# Patient Record
Sex: MALE | Race: AMERICAN INDIAN OR ALASKA NATIVE
[De-identification: names, ages, dates, MRNs, and addresses within clinical notes are randomized per-mention and may not be internally consistent; named-entity substitution may affect disease eponyms.]

---

## 2020-02-22 ENCOUNTER — HOSPITAL ENCOUNTER (EMERGENCY)
Dept: HOSPITAL 5 - ED | Age: 75
LOS: 1 days | Discharge: HOME | End: 2020-02-23
Payer: MEDICARE

## 2020-02-22 DIAGNOSIS — F02.80: ICD-10-CM
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
## 2020-02-22 DIAGNOSIS — F20.9: ICD-10-CM
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
## 2020-02-22 DIAGNOSIS — I10: ICD-10-CM
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
## 2020-02-22 DIAGNOSIS — R25.2: Primary | ICD-10-CM

## 2020-02-22 DIAGNOSIS — E11.9: ICD-10-CM

## 2020-02-22 DIAGNOSIS — G31.83: ICD-10-CM

## 2020-02-22 LAB
BASOPHILS # (AUTO): 0.1 K/MM3 (ref 0–0.1)
BASOPHILS NFR BLD AUTO: 0.6 % (ref 0–1.8)
BUN SERPL-MCNC: 10 MG/DL (ref 9–20)
BUN/CREAT SERPL: 8 %
CALCIUM SERPL-MCNC: 8.8 MG/DL (ref 8.4–10.2)
EOSINOPHIL # BLD AUTO: 0.2 K/MM3 (ref 0–0.4)
EOSINOPHIL NFR BLD AUTO: 2.2 % (ref 0–4.3)
HCT VFR BLD CALC: 42.4 % (ref 35.5–45.6)
HEMOLYSIS INDEX: 28
HGB BLD-MCNC: 14.9 GM/DL (ref 11.8–15.2)
LYMPHOCYTES # BLD AUTO: 3.4 K/MM3 (ref 1.2–5.4)
LYMPHOCYTES NFR BLD AUTO: 35.7 % (ref 13.4–35)
MCHC RBC AUTO-ENTMCNC: 35 % (ref 32–34)
MCV RBC AUTO: 95 FL (ref 84–94)
MONOCYTES # (AUTO): 1.3 K/MM3 (ref 0–0.8)
MONOCYTES % (AUTO): 13.8 % (ref 0–7.3)
PLATELET # BLD: 84 K/MM3 (ref 140–440)
RBC # BLD AUTO: 4.47 M/MM3 (ref 3.65–5.03)

## 2020-02-22 PROCEDURE — 72125 CT NECK SPINE W/O DYE: CPT

## 2020-02-22 PROCEDURE — 36415 COLL VENOUS BLD VENIPUNCTURE: CPT

## 2020-02-22 PROCEDURE — 85025 COMPLETE CBC W/AUTO DIFF WBC: CPT

## 2020-02-22 PROCEDURE — 70450 CT HEAD/BRAIN W/O DYE: CPT

## 2020-02-22 PROCEDURE — 80048 BASIC METABOLIC PNL TOTAL CA: CPT

## 2020-02-22 NOTE — CAT SCAN REPORT
CT cervical spine wo con



INDICATION:

fall neck pain.



TECHNIQUE:

All CT scans at this location are performed using the following dose modulation technique: Automated 
exposure control. 



COMPARISON:

None available.



FINDINGS:

No acute fracture is seen in the cervical spine. There is diffuse cervical spondylosis. No prevertebr
al soft tissue swelling is seen. No spinal canal stenosis is seen. Emphysematous changes are noted wi
thin the lung apices. Paraspinous musculature is unremarkable.



IMPRESSION:

1. No acute fracture is seen in the cervical spine. 



Signer Name: Bharat Weber MD 

Signed: 2/22/2020 11:53 PM

 Workstation Name: [id number]

## 2020-02-22 NOTE — EMERGENCY DEPARTMENT REPORT
ED Fall HPI





- General


Chief Complaint: Fall


Stated Complaint: GENERAL WEAKNESS


Time Seen by Provider: 02/22/20 22:49


Source: patient, EMS


Mode of arrival: Stretcher


Limitations: No Limitations





- History of Present Illness


Initial Comments: 





Mr. Yeung is a 75-year-old male with history of schizophrenia hypertension and

Parkinson's disease and diabetes mellitus who presents from [facility] after ground-level fall.  He walked fell while 

attempting to go to the bathroom.  Since that time he appeared more sluggish 

than normal. Daughter at bedside states that he is at his baseline.  Mr. Yeung

states he has a mild cramp in his neck.  Otherwise he has been in normal state 

of health.  Denies any pain after the fall


MD Complaint: fall


-: This evening


Fall From: standing


When Fall Occurred: 1-3 hours PTA


Place Fall Occurred: home


Loss of Consciousness: none


Prolonged Down Time?: no


Symptoms Prior to Fall: none


Severity: mild


Context: tripped/slipped


Associated Symptoms: other ("Sluggishness")





- Related Data


                                    Allergies











Allergy/AdvReac Type Severity Reaction Status Date / Time


 


No Known Allergies Allergy   Unverified 11/16/14 14:31














ED Review of Systems


ROS: 


Stated complaint: GENERAL WEAKNESS


Other details as noted in HPI





Comment: All other systems reviewed and negative


Constitutional: denies: fever, malaise


Respiratory: denies: cough


Cardiovascular: denies: chest pain


Gastrointestinal: denies: abdominal pain


Neurological: denies: headache, numbness, paresthesias





ED Past Medical Hx





- Past Medical History


Previous Medical History?: Yes


Hx Hypertension: Yes


Hx Diabetes: Yes


Hx Psychiatric Treatment: Yes (schizophrenia paranoid)


Hx Dementia: Yes


Additional medical history: parkinsons





- Social History


Smoking Status: Current Every Day Smoker





ED Physical Exam





- General


Limitations: No Limitations


General appearance: alert, in no apparent distress, other (GCS 15)





- Head


Head exam: Present: atraumatic, normocephalic





- Eye


Eye exam: Present: normal appearance, PERRL.  Absent: scleral icterus, conjun

ctival injection





- ENT


ENT exam: Present: mucous membranes moist





- Neck


Neck exam: Present: normal inspection, full ROM.  Absent: tenderness, 

meningismus





- Respiratory


Respiratory exam: Present: normal lung sounds bilaterally.  Absent: respiratory 

distress, wheezes, rales, rhonchi





- Cardiovascular


Cardiovascular Exam: Present: regular rate, normal rhythm, normal heart sounds. 

Absent: rubs, gallop





- GI/Abdominal


GI/Abdominal exam: Present: soft.  Absent: distended, tenderness, guarding, 

rebound





- Extremities Exam


Extremities exam: Present: normal inspection





- Back Exam


Back exam: Present: normal inspection





- Neurological Exam


Neurological exam: Present: alert, oriented X3





- Psychiatric


Psychiatric exam: Present: normal affect, normal mood





- Skin


Skin exam: Present: warm, dry, intact, normal color.  Absent: rash





ED Course


                                   Vital Signs











  02/22/20





  22:20


 


Temperature 98.3 F


 


Pulse Rate 52 L


 


Respiratory 18





Rate 


 


Blood Pressure 155/84





[Left] 


 


O2 Sat by Pulse 97





Oximetry 














ED Medical Decision Making





- Lab Data


Result diagrams: 


                                 02/22/20 23:00





                                 02/22/20 23:00





- Radiology Data


Radiology results: report reviewed





CT head, CT cervical spine: No acute findings according to radiology





Radiologist impression of CT head and cervical spine 





CT cervical spine no acute fracture seen in the cervical spine, diffuse cervical

 spondylosis no soft tissue swelling no spinal canal stenosis, positive 

emphysematous changes noted in the lung apices





CT head: No acute intracranial abnormality





- Medical Decision Making





Mr. Yeung presents after a ground-level fall.  CT head and C-spine CBC 

chemistry all within acceptable limits.  No evidence of acute traumatic injury 

after evaluation performed here in emergency department.  No evidence of acute 

medical condition.  Discharged home in stable condition.  





CBC chemistry unremarkable with exception of mild thrombocytopenia.


Critical care attestation.: 


If time is entered above; I have spent that time in minutes in the direct care 

of this critically ill patient, excluding procedure time.








ED Disposition


Clinical Impression: 


 Fall





Disposition: DC-01 TO HOME OR SELFCARE


Is pt being admited?: No


Does the pt Need Aspirin: No


Condition: Stable


Instructions:  Fall Prevention for Older Adults (ED)


Referrals: 


PRIMARY CARE,MD [Referring] - 3-5 Days

## 2020-02-23 VITALS — DIASTOLIC BLOOD PRESSURE: 86 MMHG | SYSTOLIC BLOOD PRESSURE: 147 MMHG

## 2020-02-23 NOTE — CAT SCAN REPORT
CT HEAD WITHOUT CONTRAST



INDICATION:

fall sluggish elderly.



TECHNIQUE:

All CT scans at this location are performed using CT dose reduction for ALARA by means of automated e
xposure control. 



COMPARISON:

None available.



FINDINGS:

HEMORRHAGE: None.

EXTRA-AXIAL SPACES: Normal in size and morphology for the patient's age.

VENTRICULAR SYSTEM: Normal in size and morphology for the patient's age.

BRAIN PARENCHYMA: No acute findings. 

MIDLINE SHIFT OR HERNIATION: None.





ORBITS: Normal as visualized.

SOFT TISSUES OF HEAD: Normal.

CALVARIUM: Normal.

VISUALIZED PARANASAL SINUSES AND MASTOID AIR CELLS: Clear.



ADDITIONAL FINDINGS: None.



IMPRESSION:

1. No acute intracranial abnormality.



Signer Name: Bharat Weber MD 

Signed: 2/22/2020 11:56 PM

 Workstation Name: [id number]

## 2020-11-11 ENCOUNTER — HOSPITAL ENCOUNTER (EMERGENCY)
Dept: HOSPITAL 5 - ED | Age: 75
LOS: 1 days | Discharge: HOME | End: 2020-11-12
Payer: MEDICARE

## 2020-11-11 VITALS — DIASTOLIC BLOOD PRESSURE: 75 MMHG | SYSTOLIC BLOOD PRESSURE: 105 MMHG
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
## 2020-11-11 DIAGNOSIS — S13.9XXA: Primary | ICD-10-CM

## 2020-11-11 DIAGNOSIS — E11.9: ICD-10-CM
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
## 2020-11-11 DIAGNOSIS — M54.2: ICD-10-CM

## 2020-11-11 DIAGNOSIS — F03.90: ICD-10-CM

## 2020-11-11 DIAGNOSIS — I10: ICD-10-CM

## 2020-11-11 DIAGNOSIS — F20.0: ICD-10-CM

## 2020-11-11 DIAGNOSIS — Z79.899: ICD-10-CM
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
## 2020-11-11 PROCEDURE — 72040 X-RAY EXAM NECK SPINE 2-3 VW: CPT

## 2020-11-11 PROCEDURE — 72125 CT NECK SPINE W/O DYE: CPT

## 2020-11-11 NOTE — XRAY REPORT
Cervical spine 3 views



INDICATION: Neck pain following injury



IMPRESSION: Multilevel discogenic and uncovertebral arthropathy with disc osteophyte complex at C6-C7
 which may cause slight spinal canal narrowing. The open mouth odontoid view is suboptimal and pathol
ogy cannot be excluded in this region.



Signer Name: Law Thornton MD 

Signed: 11/11/2020 9:00 PM

Workstation Name: [id number]

## 2020-11-11 NOTE — CAT SCAN REPORT
CT cervical spine wo con 



INDICATION / CLINICAL INFORMATION:

neck pain. fall.



TECHNIQUE:

Axial CT imaging of the cervical spine was obtained without contrast. Coronal and sagittal reformatte
d imaging obtained and reviewed.  All CT scans at this location are performed using CT dose reduction
 for ALARA by means of automated exposure control. 



COMPARISON:

Cervical spine radiographs obtained earlier today. Priors CT cervical spine 2/22/2020.



FINDINGS:

No cervical spine fracture or traumatic malalignment. There is diffuse spondylytic change with promin
ent facet degenerative change throughout the spine. Severe degenerative changes also present in the C
1-C2 joint.



Paravertebral soft tissues are unremarkable.



Visualized lung apices demonstrate emphysematous change.



IMPRESSION:

1. No visible fracture or suggestion of traumatic malalignment.

2. Prominent diffuse spondylytic change throughout the spine. 



Signer Name: Dulce Hdez MD 

Signed: 11/11/2020 11:13 PM

Workstation Name: [id number]

## 2020-11-11 NOTE — EMERGENCY DEPARTMENT REPORT
ED Neck Pain/Injury HPI





- General


Chief Complaint: Neck Pain/Injury


Stated Complaint: NECK PAIN


Time Seen by Provider: 11/11/20 20:03


Source: patient


Mode of arrival: Stretcher


Limitations: Altered Mental Status





- History of Present Illness


Initial Comments: 





Patient is a 75-year-old male that presents emergency room for a fall and neck 

pain.  Patient states he fell at 5 PM today.  Patient states he is having some 

stiffness in his neck.  Patient denies loss of consciousness..  Patient states 

his pain is mild to moderate.  Patient states is an aching pain.  Patient states

he just feels stiff.  Patient denies fever and chills.  Patient denies loss of 

consciousness.  Patient denies head injury.  Patient denies headache.  Patient 

denies difficulties moving.





Patient has history of dementia, schizophrenia, hypertension, hyperlipidemia, 

diabetes,.  Patient presents ER with his records from his nursing home.  Patient

is coming from [facility].  Patient brought 

in by EMS.  








Patient denies recent travel.  Patient denies recent international travel.  

Patient denies exposure to the novel coronavirus.  Patient denies sick contacts.

 Patient denies fever and chills.  Patient denies cough.  Patient denies 

diarrhea.  Patient denies coming in contact with anybody with symptoms of the 

novel coronavirus.











MD Complaint: neck pain, neck injury


-: Sudden


Place: home


Severity: mild, moderate, constant


Quality: aching


Consistency: constant


Improves With: rest supine, remaining still


Worsens With: movement of neck


Context: fall


Associated Symptoms: denies: headache, fever, numbness, tingling, weakness, 

vertigo, difficulty walking, swollen glands, difficulty swallowing, nausea, 

vomiting


Treatments Prior to Arrival: none





- Related Data


                                  Previous Rx's











 Medication  Instructions  Recorded  Last Taken  Type


 


Cyclobenzaprine HCl [Flexeril 5 MG 5 mg PO TID PRN #20 tab 11/11/20 Unknown Rx





TAB]    











                                    Allergies











Allergy/AdvReac Type Severity Reaction Status Date / Time


 


No Known Allergies Allergy   Unverified 11/16/14 14:31














ED Review of Systems


ROS: 


Stated complaint: NECK PAIN


Other details as noted in HPI





Constitutional: denies: chills, fever


Eyes: denies: eye pain, eye discharge, vision change


ENT: denies: ear pain, throat pain


Respiratory: denies: cough, shortness of breath, wheezing


Cardiovascular: denies: chest pain, palpitations


Endocrine: no symptoms reported


Gastrointestinal: denies: abdominal pain, nausea, diarrhea


Genitourinary: denies: urgency, dysuria


Musculoskeletal: denies: back pain, joint swelling, arthralgia


Skin: denies: rash, lesions


Neurological: denies: headache, weakness, paresthesias


Psychiatric: denies: anxiety, depression


Hematological/Lymphatic: denies: easy bleeding, easy bruising





ED Past Medical Hx





- Past Medical History


Previous Medical History?: Yes


Hx Hypertension: Yes


Hx Diabetes: Yes (neuropathy)


Hx Psychiatric Treatment: Yes (schizophrenia paranoid)


Hx Dementia: Yes


Additional medical history: parkinsons





- Surgical History


Past Surgical History?: No





- Family History


Family history: no significant





- Social History


Smoking Status: Unknown if ever smoked


Substance Use Type: None





- Medications


Home Medications: 


                                Home Medications











 Medication  Instructions  Recorded  Confirmed  Last Taken  Type


 


Cyclobenzaprine HCl [Flexeril 5 MG 5 mg PO TID PRN #20 tab 11/11/20  Unknown Rx





TAB]     














ED Physical Exam





- General


Limitations: No Limitations


General appearance: alert, in no apparent distress





- Head


Head exam: Present: atraumatic, normocephalic





- Eye


Eye exam: Present: normal appearance, PERRL


Pupils: Present: normal accommodation





- ENT


ENT exam: Present: normal exam, mucous membranes moist, TM's normal bilaterally,

normal external ear exam





- Neck


Neck exam: Present: normal inspection, full ROM.  Absent: tenderness, 

meningismus, lymphadenopathy, thyromegaly





- Respiratory


Respiratory exam: Present: normal lung sounds bilaterally.  Absent: respiratory 

distress, wheezes, rales





- Cardiovascular


Cardiovascular Exam: Present: regular rate, normal rhythm.  Absent: systolic 

murmur, diastolic murmur, rubs, gallop





- GI/Abdominal


GI/Abdominal exam: Present: soft, normal bowel sounds.  Absent: distended, 

tenderness, guarding, rebound





- Rectal


Rectal exam: Present: deferred





- Extremities Exam


Extremities exam: Present: normal inspection





- Back Exam


Back exam: Present: normal inspection, full ROM.  Absent: tenderness, CVA 

tenderness (R), CVA tenderness (L), muscle spasm, paraspinal tenderness, 

vertebral tenderness





- Neurological Exam


Neurological exam: Present: alert, oriented X3, CN II-XII intact, reflexes 

normal.  Absent: motor sensory deficit





- Psychiatric


Psychiatric exam: Present: normal affect, normal mood





- Skin


Skin exam: Present: warm, dry, intact, normal color.  Absent: rash





ED Course


                                   Vital Signs











  11/11/20 11/11/20 11/11/20





  18:34 18:38 18:42


 


Temperature   99.4 F


 


Pulse Rate  63 


 


Respiratory 15 16 





Rate   


 


Blood Pressure   


 


Blood Pressure  147/87 





[Left]   


 


O2 Sat by Pulse  98 





Oximetry   














  11/11/20 11/11/20 11/11/20





  18:45 19:00 19:30


 


Temperature   


 


Pulse Rate 78  


 


Respiratory 12 15 





Rate   


 


Blood Pressure 147/87 137/91 136/84


 


Blood Pressure   





[Left]   


 


O2 Sat by Pulse 98 96 96





Oximetry   














  11/11/20 11/11/20 11/11/20





  20:01 20:30 21:23


 


Temperature   


 


Pulse Rate   56 L


 


Respiratory   16





Rate   


 


Blood Pressure 135/88 134/83 


 


Blood Pressure   105/75





[Left]   


 


O2 Sat by Pulse 96 94 98





Oximetry   














- Reevaluation(s)


Reevaluation #1: 


I discussed all results and clinical findings with patient.  I discussed plan of

 care with patient.  Patient agrees with plan of care.  Patient is stable for 

discharge.  Patient will be discharged home.  Patient given discharge instr

uctions.  Patient voiced understanding of discharge instructions.


11/11/20 23:40








ED Medical Decision Making





- Radiology Data


Radiology results: report reviewed








 Cervical spine 3 views 





INDICATION: Neck pain following injury 





IMPRESSION: Multilevel discogenic and uncovertebral arthropathy with disc 

osteophyte complex at C6-


C7 which may cause slight spinal canal narrowing. The open mouth odontoid view 

is suboptimal and 


 pathology cannot be excluded in this region. 














 CT cervical spine wo con 





INDICATION / CLINICAL INFORMATION: 


neck pain. fall. 





TECHNIQUE: 


Axial CT imaging of the cervical spine was obtained without contrast. Coronal 

and sagittal 


 reformatted imaging obtained and reviewed. All CT scans at this location are 

performed using CT 


 dose reduction for ALARA by means of automated exposure control. 





COMPARISON: 


Cervical spine radiographs obtained earlier today. Priors CT cervical spine 

2/22/2020. 





FINDINGS: 


No cervical spine fracture or traumatic malalignment. There is diffuse 

spondylytic change with 


 prominent facet degenerative change throughout the spine. Severe degenerative 

changes also present 


 in the C1-C2 joint. 





Paravertebral soft tissues are unremarkable. 





Visualized lung apices demonstrate emphysematous change. 





IMPRESSION: 


1. No visible fracture or suggestion of traumatic malalignment. 


2. Prominent diffuse spondylytic change throughout the spine. 








 





- Medical Decision Making





Patient is a 75-year-old male that presents emergency room with complaints of 

neck pain after a fall.  Patient fell at his assisted living and was sent here 

for evaluation.  Patient planes of neck pain.  Patient had a cervical spine x-

ray which was negative but had inconclusive findings on C1 and C2.  Patient then

 had a CT scan to fully evaluate the neck.  Patient CT scan of the neck was 

negative for acute findings.  Patient stable for discharge.  Patient discharged 

home.  Patient given discharge instructions.





- Differential Diagnosis


Sprain, strain, fracture, contusion, neck pain, fall


Critical care attestation.: 


If time is entered above; I have spent that time in minutes in the direct care 

of this critically ill patient, excluding procedure time.








ED Disposition


Clinical Impression: 


 Neck pain





Acute cervical sprain


Qualifiers:


 Encounter type: initial encounter Qualified Code(s): S13.9XXA - Sprain of 

joints and ligaments of unspecified parts of neck, initial encounter





Fall


Qualifiers:


 Encounter type: initial encounter Qualified Code(s): W19.XXXA - Unspecified 

fall, initial encounter





Disposition: DC-01 TO HOME OR SELFCARE


Is pt being admited?: No


Does the pt Need Aspirin: No


Condition: Stable


Instructions:  Cervical Sprain, Easy-to-Read


Additional Instructions: 


Patient to follow-up with primary care in 2 to 3 days.  Patient to follow-up 

with orthopedist in 2 to 3 days.  Patient to rest.  Patient to increase water.  

  Patient to take Tylenol or ibuprofen as needed for pain.   Patient to return 

to the ER if condition worsens, changes or new symptoms arise.


Prescriptions: 


Cyclobenzaprine HCl [Flexeril 5 MG TAB] 5 mg PO TID PRN #20 tab


 PRN Reason: Muscle Spasm


Referrals: 


PRIMARY CARE,MD [Primary Care Provider] - 2-3 Days


ROMAINE LOPEZ MD [Staff Physician] - 2-3 Days


Time of Disposition: 23:42